# Patient Record
Sex: FEMALE | Race: WHITE | NOT HISPANIC OR LATINO | Employment: UNEMPLOYED | URBAN - METROPOLITAN AREA
[De-identification: names, ages, dates, MRNs, and addresses within clinical notes are randomized per-mention and may not be internally consistent; named-entity substitution may affect disease eponyms.]

---

## 2019-10-02 ENCOUNTER — TELEPHONE (OUTPATIENT)
Dept: PEDIATRIC CARDIOLOGY | Age: 24
End: 2019-10-02

## 2020-02-18 PROBLEM — Q24.9 CONGENITAL HEART DEFECT: Status: ACTIVE | Noted: 2020-02-18

## 2020-02-19 ENCOUNTER — HOSPITAL (OUTPATIENT)
Dept: OTHER | Age: 25
End: 2020-02-19
Attending: PEDIATRICS

## 2020-02-19 ENCOUNTER — OFFICE VISIT (OUTPATIENT)
Dept: PEDIATRIC CARDIOLOGY | Age: 25
End: 2020-02-19

## 2020-02-19 ENCOUNTER — APPOINTMENT (OUTPATIENT)
Dept: PEDIATRIC CARDIOLOGY | Age: 25
End: 2020-02-19

## 2020-02-19 ENCOUNTER — DIAGNOSTIC TRANS (OUTPATIENT)
Dept: OTHER | Age: 25
End: 2020-02-19

## 2020-02-19 DIAGNOSIS — I35.1 NONRHEUMATIC AORTIC VALVE INSUFFICIENCY: ICD-10-CM

## 2020-02-19 DIAGNOSIS — Q21.12 PFO (PATENT FORAMEN OVALE): Primary | ICD-10-CM

## 2020-02-19 DIAGNOSIS — Q23.1 BICUSPID AORTIC VALVE: ICD-10-CM

## 2020-02-19 DIAGNOSIS — Q24.9 CONGENITAL HEART DEFECT: ICD-10-CM

## 2020-02-19 PROBLEM — Q21.10 ASD (ATRIAL SEPTAL DEFECT): Status: ACTIVE | Noted: 2020-02-19

## 2020-02-19 PROCEDURE — 93303 ECHO TRANSTHORACIC: CPT | Performed by: PEDIATRICS

## 2020-02-19 PROCEDURE — 93010 ELECTROCARDIOGRAM REPORT: CPT | Performed by: PEDIATRICS

## 2020-02-19 PROCEDURE — 99204 OFFICE O/P NEW MOD 45 MIN: CPT | Performed by: PEDIATRICS

## 2020-02-19 PROCEDURE — 93320 DOPPLER ECHO COMPLETE: CPT | Performed by: PEDIATRICS

## 2020-02-19 PROCEDURE — 93325 DOPPLER ECHO COLOR FLOW MAPG: CPT | Performed by: PEDIATRICS

## 2020-02-19 ASSESSMENT — PAIN SCALES - GENERAL: PAINLEVEL: 0

## 2020-02-20 DIAGNOSIS — Q24.9 CONGENITAL HEART DEFECT: ICD-10-CM

## 2020-12-24 PROBLEM — Q23.1 BICUSPID AORTIC VALVE: Status: ACTIVE | Noted: 2020-12-24

## 2020-12-24 PROBLEM — Q23.81 BICUSPID AORTIC VALVE: Status: ACTIVE | Noted: 2020-12-24

## 2020-12-24 PROBLEM — I35.1 NONRHEUMATIC AORTIC VALVE INSUFFICIENCY: Status: ACTIVE | Noted: 2020-12-24

## 2021-02-15 ENCOUNTER — TELEPHONE (OUTPATIENT)
Dept: PEDIATRIC CARDIOLOGY | Age: 26
End: 2021-02-15

## 2021-03-23 ENCOUNTER — TELEPHONE (OUTPATIENT)
Dept: PEDIATRIC CARDIOLOGY | Age: 26
End: 2021-03-23

## 2021-03-24 ENCOUNTER — TELEPHONE (OUTPATIENT)
Dept: PEDIATRIC CARDIOLOGY | Age: 26
End: 2021-03-24

## 2021-05-26 VITALS
OXYGEN SATURATION: 99 % | BODY MASS INDEX: 22.66 KG/M2 | HEIGHT: 65 IN | HEART RATE: 67 BPM | DIASTOLIC BLOOD PRESSURE: 74 MMHG | WEIGHT: 136.02 LBS | SYSTOLIC BLOOD PRESSURE: 134 MMHG

## 2021-06-02 ENCOUNTER — HOSPITAL ENCOUNTER (OUTPATIENT)
Dept: PEDIATRIC CARDIOLOGY | Age: 26
Discharge: HOME | End: 2021-06-02
Attending: PEDIATRICS

## 2021-06-02 ENCOUNTER — OFFICE VISIT (OUTPATIENT)
Dept: PEDIATRIC CARDIOLOGY | Age: 26
End: 2021-06-02
Attending: PEDIATRICS

## 2021-06-02 ENCOUNTER — TELEPHONE (OUTPATIENT)
Dept: PEDIATRIC CARDIOLOGY | Age: 26
End: 2021-06-02

## 2021-06-02 VITALS
TEMPERATURE: 96.8 F | OXYGEN SATURATION: 100 % | WEIGHT: 144.18 LBS | DIASTOLIC BLOOD PRESSURE: 77 MMHG | SYSTOLIC BLOOD PRESSURE: 126 MMHG | HEART RATE: 98 BPM | BODY MASS INDEX: 23.17 KG/M2 | HEIGHT: 66 IN

## 2021-06-02 DIAGNOSIS — I35.1 NONRHEUMATIC AORTIC VALVE INSUFFICIENCY: ICD-10-CM

## 2021-06-02 DIAGNOSIS — Q21.10 ASD (ATRIAL SEPTAL DEFECT): ICD-10-CM

## 2021-06-02 DIAGNOSIS — Q23.1 BICUSPID AORTIC VALVE: ICD-10-CM

## 2021-06-02 DIAGNOSIS — Q21.10 ASD (ATRIAL SEPTAL DEFECT): Primary | ICD-10-CM

## 2021-06-02 LAB
AORTIC ROOT: 1.8 CM (ref 2.25–3.2)
AORTIC VALVE ANNULUS: 1.5 CM (ref 1.59–2.32)
BSA FOR PED ECHO PROCEDURE: 1.75 M2
FRACTIONAL SHORTENING MMODE: 37 %
IVSS (M-MODE): 0.89 CM
LEFT VENTRICULAR POSTERIOR WALL IN END DIASTOLE MMODE: 0.71 CM (ref 0.49–0.93)
LEFT VENTRICULAR POSTERIOR WALL SYSTOLE MMODE: 1.15 CM
LV END-DIASTOLIC ENDOCARDIAL DIAMETER MMODE: 4.3 CM (ref 4.16–5.85)
LV END-DIASTOLIC SEPTAL THICKNESS MMODE: 0.64 CM (ref 0.51–0.95)
LVIDS BY MMODE: 2.72 CM
LVOT 2D: 1.4 CM
RIGHT VENTRICULAR END DIASTOLIC DIAS: 2.58 CM
SINOTUBULAR JUNCTION: 1.4 CM (ref 1.82–2.65)
Z SCORE OF AORTIC VALVE ANNULUS PHN: -2.5 CM
Z SCORE OF LEFT VENTRICULAR POSTERIOR WALL IN END DIASTOLE MMODE: 0 CM
Z SCORE OF LV END-DIASTOLIC ENDOCARDIAL DIAMETER MMODE: -1.7 CM
Z SCORE OF LV END-DIASTOLIC SEPTAL THICKNESS MMODE: -0.8 CM
Z-SCORE OF AORTIC ROOT: -3.9 CM
Z-SCORE OF SINOTUBULAR JUNCTION PHN: -3.9 CM

## 2021-06-02 PROCEDURE — 99214 OFFICE O/P EST MOD 30 MIN: CPT | Performed by: PEDIATRICS

## 2021-06-02 PROCEDURE — 93303 ECHO TRANSTHORACIC: CPT | Performed by: PEDIATRICS

## 2021-06-02 PROCEDURE — 93005 ELECTROCARDIOGRAM TRACING: CPT | Performed by: PEDIATRICS

## 2021-06-02 PROCEDURE — 93325 DOPPLER ECHO COLOR FLOW MAPG: CPT | Performed by: PEDIATRICS

## 2021-06-02 PROCEDURE — 93320 DOPPLER ECHO COMPLETE: CPT | Performed by: PEDIATRICS

## 2021-06-02 PROCEDURE — 93303 ECHO TRANSTHORACIC: CPT

## 2021-06-02 PROCEDURE — 93225 XTRNL ECG REC<48 HRS REC: CPT

## 2021-06-02 ASSESSMENT — PAIN SCALES - GENERAL: PAINLEVEL: 0

## 2021-06-03 LAB
ATRIAL RATE (BPM): 88
P AXIS (DEGREES): 40
PR-INTERVAL (MSEC): 160
QRS-INTERVAL (MSEC): 72
QT-INTERVAL (MSEC): 372
QTC: 450
R AXIS (DEGREES): 76
REPORT TEXT: NORMAL
T AXIS (DEGREES): 53
VENTRICULAR RATE EKG/MIN (BPM): 88

## 2021-07-04 PROCEDURE — 93244 EXT ECG>48HR<7D REV&INTERPJ: CPT | Performed by: PEDIATRICS

## 2021-08-11 ENCOUNTER — HOSPITAL ENCOUNTER (OUTPATIENT)
Dept: MRI IMAGING | Age: 26
Discharge: HOME | End: 2021-08-11
Attending: PEDIATRICS

## 2021-08-11 DIAGNOSIS — Q21.10 ASD (ATRIAL SEPTAL DEFECT): ICD-10-CM

## 2021-08-11 DIAGNOSIS — Q23.1 BICUSPID AORTIC VALVE: ICD-10-CM

## 2021-08-11 DIAGNOSIS — I35.1 NONRHEUMATIC AORTIC VALVE INSUFFICIENCY: ICD-10-CM

## 2021-08-11 PROCEDURE — A9585 GADOBUTROL INJECTION: HCPCS | Performed by: PEDIATRICS

## 2021-08-11 PROCEDURE — 71555 MRI ANGIO CHEST W OR W/O DYE: CPT

## 2021-08-11 PROCEDURE — 10002805 HB CONTRAST AGENT: Performed by: PEDIATRICS

## 2021-08-11 PROCEDURE — 75561 CARDIAC MRI FOR MORPH W/DYE: CPT

## 2021-08-11 PROCEDURE — 71555 MRI ANGIO CHEST W OR W/O DYE: CPT | Performed by: PEDIATRICS

## 2021-08-11 PROCEDURE — 75561 CARDIAC MRI FOR MORPH W/DYE: CPT | Performed by: PEDIATRICS

## 2021-08-11 RX ORDER — GADOBUTROL 604.72 MG/ML
10 INJECTION INTRAVENOUS ONCE
Status: COMPLETED | OUTPATIENT
Start: 2021-08-11 | End: 2021-08-11

## 2021-08-11 RX ADMIN — GADOBUTROL 8.1 ML: 604.72 INJECTION INTRAVENOUS at 11:27

## 2021-08-16 ENCOUNTER — HOSPITAL ENCOUNTER (OUTPATIENT)
Dept: CARDIOLOGY | Age: 26
Discharge: HOME | End: 2021-08-16
Attending: PEDIATRICS

## 2021-08-16 VITALS
OXYGEN SATURATION: 99 % | DIASTOLIC BLOOD PRESSURE: 85 MMHG | HEIGHT: 66 IN | WEIGHT: 144 LBS | HEART RATE: 78 BPM | BODY MASS INDEX: 23.14 KG/M2 | SYSTOLIC BLOOD PRESSURE: 135 MMHG

## 2021-08-16 DIAGNOSIS — Q23.1 BICUSPID AORTIC VALVE: ICD-10-CM

## 2021-08-16 DIAGNOSIS — Q21.10 ASD (ATRIAL SEPTAL DEFECT): ICD-10-CM

## 2021-08-16 PROCEDURE — 94621 CARDIOPULM EXERCISE TESTING: CPT

## 2021-08-16 PROCEDURE — 94621 CARDIOPULM EXERCISE TESTING: CPT | Performed by: PEDIATRICS

## 2021-08-18 ENCOUNTER — APPOINTMENT (OUTPATIENT)
Dept: MRI IMAGING | Age: 26
End: 2021-08-18
Attending: PEDIATRICS

## 2021-09-02 ENCOUNTER — V-VISIT (OUTPATIENT)
Dept: PEDIATRIC CARDIOLOGY | Age: 26
End: 2021-09-02

## 2021-09-02 DIAGNOSIS — I35.1 NONRHEUMATIC AORTIC VALVE INSUFFICIENCY: ICD-10-CM

## 2021-09-02 DIAGNOSIS — Q21.10 ASD (ATRIAL SEPTAL DEFECT): ICD-10-CM

## 2021-09-02 DIAGNOSIS — Q23.1 BICUSPID AORTIC VALVE: Primary | ICD-10-CM

## 2021-09-02 LAB
PEAK HR PREDICTED: 194 BPM
STRESS TARGET HR: 194 BPM

## 2021-09-02 PROCEDURE — 99214 OFFICE O/P EST MOD 30 MIN: CPT | Performed by: PEDIATRICS

## 2022-04-08 ENCOUNTER — TELEPHONE (OUTPATIENT)
Dept: PEDIATRIC CARDIOLOGY | Age: 27
End: 2022-04-08

## 2022-04-08 DIAGNOSIS — Q21.10 ASD (ATRIAL SEPTAL DEFECT): ICD-10-CM

## 2022-04-08 DIAGNOSIS — Q23.1 BICUSPID AORTIC VALVE: Primary | ICD-10-CM

## 2022-08-06 ENCOUNTER — HOSPITAL ENCOUNTER (OUTPATIENT)
Dept: LAB | Age: 27
Discharge: HOME | End: 2022-08-06
Attending: PEDIATRICS

## 2022-08-06 DIAGNOSIS — Z01.812 PRE-PROCEDURAL LABORATORY EXAMINATION: Primary | ICD-10-CM

## 2022-08-06 PROCEDURE — U0003 INFECTIOUS AGENT DETECTION BY NUCLEIC ACID (DNA OR RNA); SEVERE ACUTE RESPIRATORY SYNDROME CORONAVIRUS 2 (SARS-COV-2) (CORONAVIRUS DISEASE [COVID-19]), AMPLIFIED PROBE TECHNIQUE, MAKING USE OF HIGH THROUGHPUT TECHNOLOGIES AS DESCRIBED BY CMS-2020-01-R: HCPCS | Performed by: PEDIATRICS

## 2022-08-07 LAB
SARS-COV-2 RNA RESP QL NAA+PROBE: NOT DETECTED
SERVICE CMNT-IMP: NORMAL
SERVICE CMNT-IMP: NORMAL

## 2022-08-08 ENCOUNTER — HOSPITAL ENCOUNTER (OUTPATIENT)
Dept: PEDIATRIC CARDIOLOGY | Age: 27
Discharge: HOME | End: 2022-08-08
Attending: PEDIATRICS

## 2022-08-08 VITALS
WEIGHT: 141.09 LBS | BODY MASS INDEX: 22.15 KG/M2 | OXYGEN SATURATION: 100 % | HEIGHT: 67 IN | HEART RATE: 85 BPM | SYSTOLIC BLOOD PRESSURE: 108 MMHG | DIASTOLIC BLOOD PRESSURE: 66 MMHG

## 2022-08-08 DIAGNOSIS — Q21.10 ASD (ATRIAL SEPTAL DEFECT): ICD-10-CM

## 2022-08-08 DIAGNOSIS — Q23.1 BICUSPID AORTIC VALVE: ICD-10-CM

## 2022-08-08 PROCEDURE — 94621 CARDIOPULM EXERCISE TESTING: CPT | Performed by: INTERNAL MEDICINE

## 2022-08-08 PROCEDURE — 94621 CARDIOPULM EXERCISE TESTING: CPT

## 2022-08-09 ENCOUNTER — HOSPITAL ENCOUNTER (OUTPATIENT)
Dept: LAB | Age: 27
Discharge: HOME | End: 2022-08-09
Attending: PEDIATRICS

## 2022-08-09 ENCOUNTER — OFFICE VISIT (OUTPATIENT)
Dept: PEDIATRIC CARDIOLOGY | Age: 27
End: 2022-08-09

## 2022-08-09 ENCOUNTER — ANCILLARY PROCEDURE (OUTPATIENT)
Dept: PEDIATRIC CARDIOLOGY | Age: 27
End: 2022-08-09
Attending: PEDIATRICS

## 2022-08-09 VITALS
SYSTOLIC BLOOD PRESSURE: 128 MMHG | DIASTOLIC BLOOD PRESSURE: 81 MMHG | BODY MASS INDEX: 22.57 KG/M2 | OXYGEN SATURATION: 100 % | HEART RATE: 69 BPM | TEMPERATURE: 98.3 F | WEIGHT: 140.43 LBS | HEIGHT: 66 IN

## 2022-08-09 DIAGNOSIS — Q23.1 BICUSPID AORTIC VALVE: ICD-10-CM

## 2022-08-09 DIAGNOSIS — Q21.10 ASD (ATRIAL SEPTAL DEFECT): ICD-10-CM

## 2022-08-09 DIAGNOSIS — Z13.6 SCREENING FOR ISCHEMIC HEART DISEASE: ICD-10-CM

## 2022-08-09 DIAGNOSIS — Q21.10 ASD (ATRIAL SEPTAL DEFECT): Primary | ICD-10-CM

## 2022-08-09 LAB
ALBUMIN SERPL-MCNC: 4 G/DL (ref 3.6–5.1)
ALBUMIN/GLOB SERPL: 1.1 {RATIO} (ref 1–2.4)
ALP SERPL-CCNC: 66 UNITS/L (ref 45–117)
ALT SERPL-CCNC: 20 UNITS/L
ANION GAP SERPL CALC-SCNC: 8 MMOL/L (ref 7–19)
AORTIC ROOT: 2.3 CM (ref 2.23–3.17)
AORTIC VALVE ANNULUS: 1.5 CM (ref 1.58–2.3)
AST SERPL-CCNC: 16 UNITS/L
ATRIAL RATE (BPM): 64
BASOPHILS # BLD: 0 K/MCL (ref 0–0.3)
BASOPHILS NFR BLD: 1 %
BILIRUB SERPL-MCNC: 0.4 MG/DL (ref 0.2–1)
BSA FOR PED ECHO PROCEDURE: 1.72 M2
BUN SERPL-MCNC: 8 MG/DL (ref 6–20)
BUN/CREAT SERPL: 11 (ref 7–25)
CALCIUM SERPL-MCNC: 9.3 MG/DL (ref 8.4–10.2)
CHLORIDE SERPL-SCNC: 107 MMOL/L (ref 97–110)
CHOLEST SERPL-MCNC: 165 MG/DL
CHOLEST/HDLC SERPL: 3.8 {RATIO}
CO2 SERPL-SCNC: 27 MMOL/L (ref 21–32)
CREAT SERPL-MCNC: 0.7 MG/DL (ref 0.51–0.95)
DEPRECATED RDW RBC: 38.6 FL (ref 39–50)
EOSINOPHIL # BLD: 0.1 K/MCL (ref 0–0.5)
EOSINOPHIL NFR BLD: 1 %
ERYTHROCYTE [DISTWIDTH] IN BLOOD: 11.8 % (ref 11–15)
FASTING DURATION TIME PATIENT: ABNORMAL H
FASTING DURATION TIME PATIENT: NORMAL H
FRACTIONAL SHORTENING MMODE: 41 %
GFR SERPLBLD BASED ON 1.73 SQ M-ARVRAT: >90 ML/MIN
GLOBULIN SER-MCNC: 3.6 G/DL (ref 2–4)
GLUCOSE SERPL-MCNC: 91 MG/DL (ref 70–99)
HBA1C MFR BLD: 5.2 % (ref 4.5–5.6)
HCT VFR BLD CALC: 42.3 % (ref 36–46.5)
HDLC SERPL-MCNC: 43 MG/DL
HGB BLD-MCNC: 14.1 G/DL (ref 12–15.5)
IMM GRANULOCYTES # BLD AUTO: 0 K/MCL (ref 0–0.2)
IMM GRANULOCYTES # BLD: 0 %
IVSS (M-MODE): 0.93 CM
LDLC SERPL CALC-MCNC: 103 MG/DL
LEFT VENTRICLE EJECTION FRACTION BY TEICHOLZ M-MODE (%): 72 %
LEFT VENTRICULAR POSTERIOR WALL IN END DIASTOLE MMODE: 0.75 CM (ref 0.49–0.93)
LEFT VENTRICULAR POSTERIOR WALL SYSTOLE MMODE: 1.36 CM
LV END-DIASTOLIC ENDOCARDIAL DIAMETER MMODE: 4.12 CM (ref 4.13–5.8)
LV END-DIASTOLIC SEPTAL THICKNESS MMODE: 0.72 CM (ref 0.5–0.94)
LVIDS BY MMODE: 2.43 CM
LYMPHOCYTES # BLD: 2.2 K/MCL (ref 1–4.8)
LYMPHOCYTES NFR BLD: 35 %
MCH RBC QN AUTO: 29.9 PG (ref 26–34)
MCHC RBC AUTO-ENTMCNC: 33.3 G/DL (ref 32–36.5)
MCV RBC AUTO: 89.6 FL (ref 78–100)
MONOCYTES # BLD: 0.4 K/MCL (ref 0.3–0.9)
MONOCYTES NFR BLD: 6 %
NEUTROPHILS # BLD: 3.6 K/MCL (ref 1.8–7.7)
NEUTROPHILS NFR BLD: 57 %
NONHDLC SERPL-MCNC: 122 MG/DL
NRBC BLD MANUAL-RTO: 0 /100 WBC
P AXIS (DEGREES): 27
PLATELET # BLD AUTO: 217 K/MCL (ref 140–450)
POTASSIUM SERPL-SCNC: 3.9 MMOL/L (ref 3.4–5.1)
PR-INTERVAL (MSEC): 144
PROT SERPL-MCNC: 7.6 G/DL (ref 6.4–8.2)
QRS-INTERVAL (MSEC): 76
QT-INTERVAL (MSEC): 418
QTC: 431
R AXIS (DEGREES): 80
RBC # BLD: 4.72 MIL/MCL (ref 4–5.2)
REPORT TEXT: NORMAL
SINOTUBULAR JUNCTION: 1.9 CM (ref 1.8–2.63)
SODIUM SERPL-SCNC: 138 MMOL/L (ref 135–145)
T AXIS (DEGREES): 62
TRIGL SERPL-MCNC: 97 MG/DL
VENTRICULAR RATE EKG/MIN (BPM): 64
WBC # BLD: 6.3 K/MCL (ref 4.2–11)
Z SCORE OF AORTIC VALVE ANNULUS PHN: -2.4 CM
Z SCORE OF LEFT VENTRICULAR POSTERIOR WALL IN END DIASTOLE MMODE: 0.4 CM
Z SCORE OF LV END-DIASTOLIC ENDOCARDIAL DIAMETER MMODE: -2 CM
Z SCORE OF LV END-DIASTOLIC SEPTAL THICKNESS MMODE: 0 CM
Z-SCORE OF AORTIC ROOT: -1.7 CM
Z-SCORE OF SINOTUBULAR JUNCTION PHN: -1.5 CM

## 2022-08-09 PROCEDURE — 83036 HEMOGLOBIN GLYCOSYLATED A1C: CPT

## 2022-08-09 PROCEDURE — 93303 ECHO TRANSTHORACIC: CPT | Performed by: PEDIATRICS

## 2022-08-09 PROCEDURE — 80053 COMPREHEN METABOLIC PANEL: CPT | Performed by: PEDIATRICS

## 2022-08-09 PROCEDURE — 85025 COMPLETE CBC W/AUTO DIFF WBC: CPT

## 2022-08-09 PROCEDURE — 93303 ECHO TRANSTHORACIC: CPT

## 2022-08-09 PROCEDURE — 93005 ELECTROCARDIOGRAM TRACING: CPT | Performed by: PEDIATRICS

## 2022-08-09 PROCEDURE — 99215 OFFICE O/P EST HI 40 MIN: CPT | Performed by: PEDIATRICS

## 2022-08-09 PROCEDURE — 80061 LIPID PANEL: CPT | Performed by: PEDIATRICS

## 2022-08-09 PROCEDURE — 93010 ELECTROCARDIOGRAM REPORT: CPT | Performed by: PEDIATRICS

## 2022-08-09 PROCEDURE — 82306 VITAMIN D 25 HYDROXY: CPT

## 2022-08-09 PROCEDURE — 93325 DOPPLER ECHO COLOR FLOW MAPG: CPT | Performed by: PEDIATRICS

## 2022-08-09 PROCEDURE — 36415 COLL VENOUS BLD VENIPUNCTURE: CPT | Performed by: PEDIATRICS

## 2022-08-09 PROCEDURE — 93320 DOPPLER ECHO COMPLETE: CPT | Performed by: PEDIATRICS

## 2022-08-09 ASSESSMENT — PAIN SCALES - GENERAL: PAINLEVEL: 0

## 2022-08-12 LAB
25(OH)D2 SERPL-MCNC: <1 NG/ML
25(OH)D3 SERPL-MCNC: 23.4 NG/ML
25(OH)D3+25(OH)D2 SERPL-MCNC: 23.4 NG/ML (ref 30–80)

## 2022-08-19 LAB
PEAK HR PREDICTED: 193 BPM
STRESS TARGET HR: 193 BPM

## 2024-09-11 ENCOUNTER — EMPLOYEE HEALTH (OUTPATIENT)
Dept: OTHER | Facility: HOSPITAL | Age: 29
End: 2024-09-11
Attending: PREVENTIVE MEDICINE

## 2024-09-11 DIAGNOSIS — Z11.1 SCREENING-PULMONARY TB: Primary | ICD-10-CM

## 2024-09-11 PROCEDURE — 86480 TB TEST CELL IMMUN MEASURE: CPT

## 2024-09-13 LAB
M TB IFN-G CD4+ T-CELLS BLD-ACNC: 0 IU/ML
M TB TUBERC IFN-G BLD QL: NEGATIVE
M TB TUBERC IGNF/MITOGEN IGNF CONTROL: >10 IU/ML
QFT TB1 AG MINUS NIL: 0.03 IU/ML
QFT TB2 AG MINUS NIL: 0.05 IU/ML

## 2025-04-07 ENCOUNTER — TELEPHONE (OUTPATIENT)
Dept: PEDIATRIC CARDIOLOGY | Age: 30
End: 2025-04-07

## 2025-04-07 DIAGNOSIS — Q23.81 BICUSPID AORTIC VALVE: ICD-10-CM

## 2025-04-07 DIAGNOSIS — Q21.10 ASD (ATRIAL SEPTAL DEFECT) (CMD): Primary | ICD-10-CM

## 2025-05-08 ENCOUNTER — ANCILLARY PROCEDURE (OUTPATIENT)
Dept: PEDIATRIC CARDIOLOGY | Age: 30
End: 2025-05-08
Attending: PEDIATRICS

## 2025-05-08 ENCOUNTER — APPOINTMENT (OUTPATIENT)
Dept: PEDIATRIC CARDIOLOGY | Age: 30
End: 2025-05-08

## 2025-05-08 VITALS
SYSTOLIC BLOOD PRESSURE: 172 MMHG | BODY MASS INDEX: 21.7 KG/M2 | WEIGHT: 135.03 LBS | DIASTOLIC BLOOD PRESSURE: 107 MMHG | OXYGEN SATURATION: 100 % | HEIGHT: 66 IN | HEART RATE: 76 BPM

## 2025-05-08 DIAGNOSIS — Q21.10 ASD (ATRIAL SEPTAL DEFECT) (CMD): Primary | ICD-10-CM

## 2025-05-08 DIAGNOSIS — Q23.81 BICUSPID AORTIC VALVE: ICD-10-CM

## 2025-05-08 DIAGNOSIS — Q21.10 ASD (ATRIAL SEPTAL DEFECT) (CMD): ICD-10-CM

## 2025-05-08 LAB
AORTIC ROOT: 2.3 CM (ref 2.21–3.14)
AORTIC VALVE ANNULUS: 1.6 CM (ref 1.56–2.28)
AORTIC VALVE MEAN VELOCITY: 1.2 M/S
ASCENDING AORTA: 2.16 CM (ref 1.86–2.79)
AV LVOT PEAK GRADIENT: 6 MMHG
AV MEAN PRESS GRAD SYS DOP V1V2: 7 MMHG
AV PEAK GRADIENT: 14 MMHG
AV VELOCITY RATIO: 0.68
AV VMAX SYS DOP: 1.9 M/S
BSA FOR PED ECHO PROCEDURE: 1.69 M2
DOP CALC AO VTI: 36 CM
DOP CALC LVOT PEAK VEL: 1.3 M/S
FRACTIONAL SHORTENING: 42 %
LEFT VENTRICLE EJECTION FRACTION BY TEICHOLZ 2D (%): 73 %
LEFT VENTRICLE END SYSTOLIC SEPTAL THICKNESS: 1.07 CM
LEFT VENTRICULAR POSTERIOR WALL IN END DIASTOLE (LVPW): 0.64 CM (ref 0.49–0.92)
LEFT VENTRICULAR POSTERIOR WALL IN END SYSTOLE: 0.72 CM
LV EF BIPLANE MOD: 70 %
LV EF US.2D.A2C+ESTIMATED: 72 %
LV EF US.2D.A4C+ESTIMATED: 69 %
LV SHORT-AXIS END-DIASTOLIC ENDOCARDIAL DIAMETER: 4.41 CM (ref 4.1–5.76)
LV SHORT-AXIS END-DIASTOLIC SEPTAL THICKNESS: 0.58 CM (ref 0.5–0.93)
LV SHORT-AXIS END-SYSTOLIC ENDOCARDIAL DIAMETER: 2.57 CM
LV THICKNESS:DIMENSION RATIO: 0.15 CM (ref 0.09–0.21)
Z SCORE OF AORTIC VALVE ANNULUS PHN: -1.8 CM
Z SCORE OF LEFT VENTRICULAR POSTERIOR WALL IN END DIASTOLE: -0.6 CM
Z SCORE OF LV SHORT-AXIS END-DIASTOLIC ENDOCARDIAL DIAMETER: -1.2 CM
Z SCORE OF LV SHORT-AXIS END-DIASTOLIC SEPTAL THICKNESS: -1.2 CM
Z SCORE OF LV THICKNESS:DIMENSION RATIO: -0.2
Z-SCORE OF AORTIC ROOT: -1.6 CM
Z-SCORE OF ASCENDING AORTA: -0.7 CM

## 2025-05-08 PROCEDURE — 99215 OFFICE O/P EST HI 40 MIN: CPT | Performed by: PEDIATRICS

## 2025-05-08 PROCEDURE — 93320 DOPPLER ECHO COMPLETE: CPT | Performed by: PEDIATRICS

## 2025-05-08 PROCEDURE — 93325 DOPPLER ECHO COLOR FLOW MAPG: CPT | Performed by: PEDIATRICS

## 2025-05-08 PROCEDURE — 93000 ELECTROCARDIOGRAM COMPLETE: CPT | Performed by: PEDIATRICS

## 2025-05-08 PROCEDURE — 93303 ECHO TRANSTHORACIC: CPT | Performed by: PEDIATRICS

## 2025-05-21 ENCOUNTER — OFFICE VISIT (OUTPATIENT)
Dept: OBGYN CLINIC | Facility: CLINIC | Age: 30
End: 2025-05-21
Payer: COMMERCIAL

## 2025-05-21 VITALS
HEIGHT: 65 IN | SYSTOLIC BLOOD PRESSURE: 108 MMHG | HEART RATE: 80 BPM | BODY MASS INDEX: 22.49 KG/M2 | DIASTOLIC BLOOD PRESSURE: 70 MMHG | WEIGHT: 135 LBS

## 2025-05-21 DIAGNOSIS — Z01.419 WELL WOMAN EXAM WITH ROUTINE GYNECOLOGICAL EXAM: Primary | ICD-10-CM

## 2025-05-21 DIAGNOSIS — N84.1 CERVICAL POLYP: ICD-10-CM

## 2025-05-21 DIAGNOSIS — Z12.4 CERVICAL CANCER SCREENING: ICD-10-CM

## 2025-05-21 PROCEDURE — 99385 PREV VISIT NEW AGE 18-39: CPT | Performed by: NURSE PRACTITIONER

## 2025-05-21 RX ORDER — METOCLOPRAMIDE 10 MG/1
10 TABLET ORAL EVERY 6 HOURS PRN
Qty: 30 TABLET | Refills: 0 | Status: SHIPPED | OUTPATIENT
Start: 2025-05-21 | End: 2025-05-21 | Stop reason: CLARIF

## 2025-05-21 RX ORDER — NICOTINE 14MG/24HR
1 PATCH, TRANSDERMAL 24 HOURS TRANSDERMAL DAILY
COMMUNITY

## 2025-05-21 NOTE — PROGRESS NOTES
Here for new gynecology visit.  30 year old G 0 P 0.  Patient's last menstrual period was 05/09/2025 (exact date)..     Here for Annual Gynecologic Exam.     Menses Q 26-28 days for 6 days.  Nothing for contraception.    Last pap smear was in 2021 and it was normal.  She has had a hx of abnormal pap smears.     OB Hx:  neg.    Family gyn hx:  neg.   Family breast hx:  neg.    Past Medical History[1]    Past Surgical History[2]    Medications Ordered Prior to Encounter[3]    OB History   No obstetric history on file.       ROS:    General:  No wt loss, wt gain, appetite changes.  Breasts:  No pain, lumps or secretions.  :   No urgency, frequency, NAIMA, bladder problems in past.    /70   Pulse 80   Ht 65\"   Wt 135 lb (61.2 kg)   LMP 05/09/2025 (Exact Date)   BMI 22.47 kg/m²     NECK:  Thyroid normal size without nodules. No adenopathy.  LUNGS:  Clear to auscultation.  COR;  Regular rate and rhythm. History of murmer.     BREASTS:  symmetrical in shape. No masses, tenderness, secretions, or adenopathy.  ABDOMEN:  Soft and non tender.  No organomegaly.  No inguinal adenopathy.  VULVA:  No lesions or erythema.  VAGINA:  No lesions, scant mucous noted.  CERVIX:  Small 3 mm endocervical polyp noted  Pap smear done with  HPV.  UTERUS:  anteflexed and normal size.  ADNEXA:  No pain or masses.    IMP/PLAN:    1. Well woman exam with routine gynecological exam  Regular self breast exams recommended    2. Cervical cancer screening  - ThinPrep PAP with HPV Reflex Request; Future    3. Cervical polyp  Advised she call with any irregular bleeding    See in 1 year/ prn.         [1] History reviewed. No pertinent past medical history.  [2] History reviewed. No pertinent surgical history.  [3]   Current Outpatient Medications on File Prior to Visit   Medication Sig Dispense Refill    Pediatric Multivitamins-Fl (MULTIVITAMIN + FLUORIDE) 0.25 MG Oral Chew Tab Chew 1 tablet by mouth daily.      Saccharomyces boulardii  (PROBIOTIC) 250 MG Oral Cap Take 1 capsule by mouth daily.       No current facility-administered medications on file prior to visit.

## 2025-05-27 LAB — HPV E6+E7 MRNA CVX QL NAA+PROBE: NEGATIVE
